# Patient Record
Sex: FEMALE | Race: WHITE | NOT HISPANIC OR LATINO | Employment: FULL TIME | URBAN - METROPOLITAN AREA
[De-identification: names, ages, dates, MRNs, and addresses within clinical notes are randomized per-mention and may not be internally consistent; named-entity substitution may affect disease eponyms.]

---

## 2019-08-20 ENCOUNTER — OFFICE VISIT (OUTPATIENT)
Dept: URGENT CARE | Facility: CLINIC | Age: 49
End: 2019-08-20

## 2019-08-20 VITALS
DIASTOLIC BLOOD PRESSURE: 70 MMHG | WEIGHT: 106 LBS | SYSTOLIC BLOOD PRESSURE: 108 MMHG | TEMPERATURE: 97.9 F | HEART RATE: 73 BPM | BODY MASS INDEX: 20.01 KG/M2 | RESPIRATION RATE: 16 BRPM | OXYGEN SATURATION: 99 % | HEIGHT: 61 IN

## 2019-08-20 DIAGNOSIS — S80.812A ABRASION OF LEFT LOWER EXTREMITY, INITIAL ENCOUNTER: ICD-10-CM

## 2019-08-20 DIAGNOSIS — Z23 NEED FOR TDAP VACCINATION: ICD-10-CM

## 2019-08-20 PROCEDURE — 90715 TDAP VACCINE 7 YRS/> IM: CPT | Performed by: PHYSICIAN ASSISTANT

## 2019-08-20 PROCEDURE — 99203 OFFICE O/P NEW LOW 30 MIN: CPT | Mod: 25 | Performed by: PHYSICIAN ASSISTANT

## 2019-08-20 PROCEDURE — 90471 IMMUNIZATION ADMIN: CPT | Performed by: PHYSICIAN ASSISTANT

## 2019-08-20 RX ORDER — METRONIDAZOLE 7.5 MG/G
GEL VAGINAL
Refills: 7 | COMMUNITY
Start: 2019-08-15

## 2019-08-20 RX ORDER — LEVOTHYROXINE SODIUM 175 UG/1
175 TABLET ORAL
COMMUNITY

## 2019-08-20 RX ORDER — PAROXETINE 10 MG/1
10 TABLET, FILM COATED ORAL DAILY
COMMUNITY

## 2019-08-20 SDOH — HEALTH STABILITY: MENTAL HEALTH: HOW OFTEN DO YOU HAVE A DRINK CONTAINING ALCOHOL?: NEVER

## 2019-08-20 ASSESSMENT — ENCOUNTER SYMPTOMS
CHILLS: 0
FEVER: 0

## 2019-08-20 NOTE — PROGRESS NOTES
"Subjective:   Loida Campos is a 48 y.o. female who presents for Laceration (cut left leg on piece of metal, requesting tetanus shot )    This is a new problem.  Patient presents to urgent care with small abrasion to left lower leg that occurred last evening.  Patient was moving a metal ladder over a stream when she sustained an abrasion to the left lower anterior leg.  She does not believe she has had a tetanus within the last 5 years and presents today for tetanus booster.    Past medical history, family history and social history are reviewed and updated in the record today.       Laceration        Review of Systems   Constitutional: Negative for chills and fever.   Skin:        Abrasion left leg   All other systems reviewed and are negative.    No Known Allergies     Objective:   /70   Pulse 73   Temp 36.6 °C (97.9 °F) (Temporal)   Resp 16   Ht 1.549 m (5' 1\")   Wt 48.1 kg (106 lb)   SpO2 99%   BMI 20.03 kg/m²   Physical Exam   Constitutional: She is oriented to person, place, and time. She appears well-developed and well-nourished.   HENT:   Head: Normocephalic and atraumatic.   Right Ear: External ear normal.   Left Ear: External ear normal.   Nose: Nose normal.   Eyes: Pupils are equal, round, and reactive to light. Conjunctivae and EOM are normal.   Neck: Normal range of motion. Neck supple.   Cardiovascular: Normal rate, regular rhythm and normal heart sounds.   Pulmonary/Chest: Effort normal and breath sounds normal.   Musculoskeletal: Normal range of motion.   Lymphadenopathy:     She has no cervical adenopathy.   Neurological: She is alert and oriented to person, place, and time.   Skin: Skin is warm and dry. No rash noted.        Left lower extremity anterior aspect with small abrasion without active bleeding or evidence of infection.   Psychiatric: She has a normal mood and affect. Judgment normal.          Assessment/Plan:   Assessment    1. Abrasion of left lower extremity, initial " encounter  - Tdap =>6yo IM    2. Need for Tdap vaccination  - Tdap =>6yo IM    Tetanus is updated today.  I reviewed with the patient I would prescribe Bactroban to use 3 times a day for 5 days however the patient reports that her mother is a nurse practitioner and she believes that her mother has this at home and will just use her mother's medication.  Observe closely for signs of infection.      Differential diagnosis, natural history, supportive care, and indications for immediate follow-up discussed.      If not improving in 3-5 days, F/U with PCP or return to  or sooner if worsens  Red flag warning symptoms and strict ER/follow-up precautions given.     The patient demonstrated a good understanding and agreed with the treatment plan.  Please note that this note was created using voice recognition speech to text software. Every effort has been made to correct obvious errors.  However, I expect there are errors of grammar and possibly context that were not discovered prior to finalizing the note  BRYANNA Coats PA-C

## 2019-09-13 ENCOUNTER — APPOINTMENT (RX ONLY)
Dept: URBAN - METROPOLITAN AREA CLINIC 35 | Facility: CLINIC | Age: 49
Setting detail: DERMATOLOGY
End: 2019-09-13

## 2019-09-13 DIAGNOSIS — Z41.9 ENCOUNTER FOR PROCEDURE FOR PURPOSES OTHER THAN REMEDYING HEALTH STATE, UNSPECIFIED: ICD-10-CM

## 2019-09-13 PROCEDURE — ? BOTOX

## 2019-09-13 PROCEDURE — ? FILLERS

## 2019-09-13 PROCEDURE — ? ADDITIONAL NOTES

## 2019-09-13 NOTE — PROCEDURE: FILLERS
Include Cannula Information In Note?: No
Nasolabial Folds Filler Volume In Cc: 0
Additional Area 4 Location: Scars
Additional Area 5 Location: Earlobes
Additional Area 1 Location: Oral Commisures
Additional Area 3 Location: Fine lines around mouth
Use Map Statement For Sites (Optional): Yes
Filler: Juvederm Voluma XC
Lot #: [id number]
Map Statment: See Attach Map for Complete Details
Additional Area 2 Location: Border of lips
Temple Hollows Filler Volume In Cc: 1
Detail Level: Detailed
Consent: Written consent obtained. Risks include but not limited to bruising, beading, irregular texture, ulceration, infection, allergic reaction, scar formation, incomplete augmentation, temporary nature, procedural pain.
Price (Use Numbers Only, No Special Characters Or $): 446
Expiration Date (Month Year): 10/22/2020
Post-Care Instructions: Patient instructed to apply ice to reduce swelling.

## 2019-09-13 NOTE — PROCEDURE: BOTOX
Masseter Units: 0
Reconstitution Date (Optional): 09/13/2019
Expiration Date (Month Year): 04/2022
Additional Area 3 Location: Frontalis
Detail Level: Detailed
Additional Area 4 Location: Bunny lines
Price (Use Numbers Only, No Special Characters Or $): 067
Additional Area 6 Location: platysma
Post-Care Instructions: Patient instructed to not lie down for 4 hours after injections and limit physical activity for 24 hours.
Additional Area 2 Location: Crows Feet
Lot #: [id number]
Additional Area 1 Location: Glabella
Additional Area 5 Location: perioral
Dilution (U/0.1 Cc): 5
Consent: Verbal and written informed consent were obtained to include the following risks: pain, swelling, bruising, eyelid or eyebrow droop, and lack of visible improvement of wrinkles in the areas treated.  The skin was cleansed with alcohol. Injections were administered with a 32g needle into the following areas:

## 2019-09-19 ENCOUNTER — APPOINTMENT (RX ONLY)
Dept: URBAN - METROPOLITAN AREA CLINIC 35 | Facility: CLINIC | Age: 49
Setting detail: DERMATOLOGY
End: 2019-09-19

## 2019-09-19 DIAGNOSIS — Z41.9 ENCOUNTER FOR PROCEDURE FOR PURPOSES OTHER THAN REMEDYING HEALTH STATE, UNSPECIFIED: ICD-10-CM

## 2019-09-19 PROCEDURE — ? FILLERS

## 2019-09-19 PROCEDURE — ? ADDITIONAL NOTES

## 2019-09-19 NOTE — PROCEDURE: FILLERS
Additional Area 1 Location: Oral Commisures
Marionette Lines Filler Volume In Cc: 0
Additional Area 2 Location: Border of lips
Post-Care Instructions: Patient instructed to apply ice to reduce swelling.
Include Cannula Information In Note?: No
Price (Use Numbers Only, No Special Characters Or $): 486
Filler Comments: 0.9 cc right temple\\n0.1 cc right mandible
Topical Anesthesia?: 23% lidocaine, 7% tetracaine
Additional Area 4 Location: Scars
Lot #: [id number]
Additional Area 5 Location: Earlobes
Additional Area 3 Location: Fine lines around mouth
Map Statment: See Attach Map for Complete Details
Detail Level: Detailed
Filler: Juvederm Voluma XC
Use Map Statement For Sites (Optional): Yes
Expiration Date (Month Year): 11/26/2020
Consent: Written consent obtained. Risks include but not limited to bruising, beading, irregular texture, ulceration, infection, allergic reaction, scar formation, incomplete augmentation, temporary nature, procedural pain.

## 2020-01-06 ENCOUNTER — APPOINTMENT (RX ONLY)
Dept: URBAN - METROPOLITAN AREA CLINIC 35 | Facility: CLINIC | Age: 50
Setting detail: DERMATOLOGY
End: 2020-01-06

## 2020-01-06 DIAGNOSIS — Z41.9 ENCOUNTER FOR PROCEDURE FOR PURPOSES OTHER THAN REMEDYING HEALTH STATE, UNSPECIFIED: ICD-10-CM

## 2020-01-06 PROCEDURE — ? BOTOX

## 2020-01-06 PROCEDURE — ? FILLERS

## 2020-01-06 PROCEDURE — ? ADDITIONAL NOTES

## 2020-01-06 NOTE — PROCEDURE: FILLERS
Additional Area 1 Location: Oral Commisures
Additional Area 5 Volume In Cc: 0
Additional Area 3 Location: Fine lines around mouth
Include Cannula Information In Note?: No
Consent: Written consent obtained. Risks include but not limited to bruising, beading, irregular texture, ulceration, infection, allergic reaction, scar formation, incomplete augmentation, temporary nature, procedural pain.
Additional Area 5 Location: Earlobes
Additional Area 2 Location: Border of lips
Filler: Juvederm Voluma XC
Additional Area 4 Location: Scars
Map Statment: See Attach Map for Complete Details
Detail Level: Detailed
Use Map Statement For Sites (Optional): Yes
Post-Care Instructions: Patient instructed to apply ice to reduce swelling.

## 2020-01-06 NOTE — PROCEDURE: BOTOX
Additional Area 5 Location: perioral
Reconstitution Date (Optional): 09/13/2019
Additional Area 4 Units: 0
Additional Area 2 Location: Crows Feet
Price (Use Numbers Only, No Special Characters Or $): 577
Expiration Date (Month Year): 04/2022
Consent: Verbal and written informed consent were obtained to include the following risks: pain, swelling, bruising, eyelid or eyebrow droop, and lack of visible improvement of wrinkles in the areas treated.  The skin was cleansed with alcohol. Injections were administered with a 32g needle into the following areas:
Additional Area 3 Location: Frontalis
Post-Care Instructions: Patient instructed to not lie down for 4 hours after injections and limit physical activity for 24 hours.
Additional Area 4 Location: Bunny lines
Detail Level: Detailed
Dilution (U/0.1 Cc): 5
Additional Area 1 Location: Glabella
Lot #: [id number]
Additional Area 6 Location: platysma

## 2020-02-06 ENCOUNTER — APPOINTMENT (RX ONLY)
Dept: URBAN - NONMETROPOLITAN AREA CLINIC 15 | Facility: CLINIC | Age: 50
Setting detail: DERMATOLOGY
End: 2020-02-06

## 2020-02-06 DIAGNOSIS — Z41.9 ENCOUNTER FOR PROCEDURE FOR PURPOSES OTHER THAN REMEDYING HEALTH STATE, UNSPECIFIED: ICD-10-CM

## 2020-02-06 PROCEDURE — ? FILLERS

## 2020-02-06 PROCEDURE — ? BOTOX

## 2020-02-06 PROCEDURE — ? ADDITIONAL NOTES

## 2020-02-06 NOTE — PROCEDURE: BOTOX
Expiration Date (Month Year): 05/22
Additional Area 5 Units: 0
Additional Area 3 Units: 10
Consent: Verbal and written informed consent were obtained to include the following risks: pain, swelling, bruising, eyelid or eyebrow droop, and lack of visible improvement of wrinkles in the areas treated.  The skin was cleansed with alcohol. Injections were administered with a 32g needle into the following areas:
Additional Area 4 Location: Bunny lines
Price (Use Numbers Only, No Special Characters Or $): 768
Additional Area 1 Units: 12
Post-Care Instructions: Patient instructed to not lie down for 4 hours after injections and limit physical activity for 24 hours.
Detail Level: Detailed
Additional Area 2 Location: Crows Feet
Additional Area 5 Location: perioral
Reconstitution Date (Optional): 02/6/20
Dilution (U/0.1 Cc): 5
Lot #: [id number]
Additional Area 6 Location: platysma
Additional Area 3 Location: Frontalis
Additional Area 2 Units: 28
Additional Area 1 Location: Glabella

## 2020-02-06 NOTE — PROCEDURE: FILLERS
Price (Use Numbers Only, No Special Characters Or $): 9517
Jawline Filler Volume In Cc: 0
Use Map Statement For Sites (Optional): Yes
Additional Area 3 Location: Fine lines around mouth
Post-Care Instructions: Patient instructed to apply ice to reduce swelling.
Include Cannula Information In Note?: No
Additional Area 5 Location: Earlobes
Expiration Date (Month Year): 8/11/20
Consent: Written consent obtained. Risks include but not limited to bruising, beading, irregular texture, ulceration, infection, allergic reaction, scar formation, incomplete augmentation, temporary nature, procedural pain.
Tear Troughs Filler Volume In Cc: 0.5
Additional Area 1 Location: Oral Commisures
Additional Area 4 Location: Scars
Additional Area 2 Location: Border of lips
Lot #: [id number]
Lot #: [id number]
Detail Level: Detailed
Filler: Juvederm Voluma XC
Temple Hollows Filler Volume In Cc: 1
Map Statment: See Attach Map for Complete Details
Topical Anesthesia?: 23% lidocaine, 7% tetracaine
Expiration Date (Month Year): 2/12/21
Filler: Juvederm Volbella XC

## 2020-10-14 ENCOUNTER — APPOINTMENT (RX ONLY)
Dept: URBAN - METROPOLITAN AREA CLINIC 35 | Facility: CLINIC | Age: 50
Setting detail: DERMATOLOGY
End: 2020-10-14

## 2020-10-14 DIAGNOSIS — Z41.9 ENCOUNTER FOR PROCEDURE FOR PURPOSES OTHER THAN REMEDYING HEALTH STATE, UNSPECIFIED: ICD-10-CM

## 2020-10-14 PROCEDURE — ? ADDITIONAL NOTES

## 2020-10-14 PROCEDURE — ? BOTOX

## 2020-10-14 PROCEDURE — ? FILLERS

## 2020-10-14 NOTE — PROCEDURE: BOTOX
Expiration Date (Month Year): 12/22
Additional Area 5 Units: 0
Additional Area 3 Units: 10
Consent: Verbal and written informed consent were obtained to include the following risks: pain, swelling, bruising, eyelid or eyebrow droop, and lack of visible improvement of wrinkles in the areas treated.  The skin was cleansed with alcohol. Injections were administered with a 32g needle into the following areas:
Additional Area 4 Location: Bunny lines
Price (Use Numbers Only, No Special Characters Or $): 317
Additional Area 1 Units: 12
Post-Care Instructions: Patient instructed to not lie down for 4 hours after injections and limit physical activity for 24 hours.
Detail Level: Detailed
Additional Area 2 Location: Crows Feet
Additional Area 5 Location: perioral
Reconstitution Date (Optional): 10/14/20
Dilution (U/0.1 Cc): 1.1
Lot #: [id number]
Additional Area 6 Location: platysma
Additional Area 3 Location: Frontalis
Additional Area 2 Units: 28
Additional Area 1 Location: Glabella

## 2020-12-30 ENCOUNTER — APPOINTMENT (RX ONLY)
Dept: URBAN - METROPOLITAN AREA CLINIC 35 | Facility: CLINIC | Age: 50
Setting detail: DERMATOLOGY
End: 2020-12-30

## 2020-12-30 DIAGNOSIS — Z41.9 ENCOUNTER FOR PROCEDURE FOR PURPOSES OTHER THAN REMEDYING HEALTH STATE, UNSPECIFIED: ICD-10-CM

## 2020-12-30 PROCEDURE — ? BOTOX

## 2020-12-30 PROCEDURE — ? ADDITIONAL NOTES

## 2020-12-30 PROCEDURE — ? FILLERS

## 2020-12-30 NOTE — PROCEDURE: FILLERS
Vermilion Lips Filler Volume In Cc: 0
Additional Area 3 Location: Fine lines around mouth
Use Map Statement For Sites (Optional): Yes
Additional Area 1 Location: Oral Commisures
Additional Area 5 Location: Earlobes
Additional Area 4 Location: Scars
Map Statment: See Attach Map for Complete Details
Include Cannula Information In Note?: No
Filler: Vollure
Detail Level: Detailed
Additional Area 2 Location: Border of lips
Lot #: [id number]
Expiration Date (Month Year): 2020-12-30
Lot #: [id number]
Post-Care Instructions: Patient instructed to apply ice to reduce swelling.
Price (Use Numbers Only, No Special Characters Or $): 1500.00
Expiration Date (Month Year): 2021-12-27
Consent: Written consent obtained. Risks include but not limited to bruising, bleeding, blindness, stroke, delayed onset of nodules, irregular texture, ulceration, infection, allergic reaction, scar formation, incomplete augmentation, temporary nature, procedural pain.
Filler: Juvederm Voluma XC

## 2020-12-30 NOTE — PROCEDURE: BOTOX
Expiration Date (Month Year): 08/2023
Additional Area 5 Units: 0
Additional Area 3 Units: 13
Consent: Verbal and written informed consent were obtained to include the following risks: pain, swelling, bruising, eyelid or eyebrow droop, and lack of visible improvement of wrinkles in the areas treated.  The skin was cleansed with alcohol. Injections were administered with a 32g needle into the following areas:
Additional Area 4 Location: Bunny lines
Price (Use Numbers Only, No Special Characters Or $): 900
Additional Area 1 Units: 12
Post-Care Instructions: Patient instructed to not lie down for 4 hours after injections and limit physical activity for 24 hours.
Detail Level: Detailed
Additional Area 2 Location: Crows Feet
Additional Area 5 Location: perioral
Reconstitution Date (Optional): 12/30/2020
Dilution (U/0.1 Cc): 0.5
Lot #: [id number]
Additional Area 6 Location: platysma
Additional Area 3 Location: Frontalis
Additional Area 2 Units: 50
Additional Area 1 Location: Glabella

## 2021-08-12 ENCOUNTER — APPOINTMENT (RX ONLY)
Dept: URBAN - METROPOLITAN AREA CLINIC 35 | Facility: CLINIC | Age: 51
Setting detail: DERMATOLOGY
End: 2021-08-12

## 2021-08-12 DIAGNOSIS — Z41.9 ENCOUNTER FOR PROCEDURE FOR PURPOSES OTHER THAN REMEDYING HEALTH STATE, UNSPECIFIED: ICD-10-CM

## 2021-08-12 PROCEDURE — ? BOTOX

## 2021-08-12 PROCEDURE — ? ADDITIONAL NOTES

## 2021-08-12 PROCEDURE — ? FILLERS

## 2021-08-12 PROCEDURE — ? COSMETIC CONSULTATION: LASER RESURFACING

## 2021-08-12 NOTE — PROCEDURE: BOTOX
Expiration Date (Month Year): 11/23
Additional Area 5 Units: 0
Additional Area 3 Units: 13
Consent: Verbal and written informed consent were obtained to include the following risks: pain, swelling, bruising, eyelid or eyebrow droop, and lack of visible improvement of wrinkles in the areas treated.  The skin was cleansed with alcohol. Injections were administered with a 32g needle into the following areas:
Additional Area 4 Location: Bunny lines
Price (Use Numbers Only, No Special Characters Or $): 900
Additional Area 1 Units: 12
Post-Care Instructions: Patient instructed to not lie down for 4 hours after injections and limit physical activity for 24 hours.
Detail Level: Detailed
Additional Area 2 Location: Crows Feet
Additional Area 5 Location: perioral
Reconstitution Date (Optional): 8/12/21
Dilution (U/0.1 Cc): 1.1
Lot #: [id number]
Additional Area 6 Location: platysma
Additional Area 3 Location: Frontalis
Additional Area 2 Units: 50
Additional Area 1 Location: Glabella

## 2021-08-12 NOTE — PROCEDURE: FILLERS
Additional Area 4 Volume In Cc: 0
Additional Area 4 Location: Scars
Additional Area 3 Location: Fine lines around mouth
Additional Area 2 Location: Border of lips
Filler: Juvederm Voluma XC
Filler: Juvederm Volbella XC
Include Cannula Information In Note?: No
Additional Area 1 Location: Oral Commisures
Lot #: [id number]
Additional Area 5 Location: Earlobes
Temple Hollows Filler Volume In Cc: 2
Map Statment: See Attach Map for Complete Details
Topical Anesthesia?: 23% lidocaine, 7% tetracaine
Price (Use Numbers Only, No Special Characters Or $): 2050
Consent: Written consent obtained. Risks include but not limited to bruising, bleeding, blindness, stroke, delayed onset of nodules, irregular texture, ulceration, infection, allergic reaction, scar formation, incomplete augmentation, temporary nature, procedural pain.
Expiration Date (Month Year): 7/10/22
Post-Care Instructions: Patient instructed to apply ice to reduce swelling.
Use Map Statement For Sites (Optional): Yes
Lot #: [id number]
Detail Level: Detailed
Tear Troughs Filler Volume In Cc: 0.5
Expiration Date (Month Year): 8/21/22

## 2022-06-10 ENCOUNTER — APPOINTMENT (RX ONLY)
Dept: URBAN - METROPOLITAN AREA CLINIC 35 | Facility: CLINIC | Age: 52
Setting detail: DERMATOLOGY
End: 2022-06-10

## 2022-06-10 DIAGNOSIS — Z41.9 ENCOUNTER FOR PROCEDURE FOR PURPOSES OTHER THAN REMEDYING HEALTH STATE, UNSPECIFIED: ICD-10-CM

## 2022-06-10 PROCEDURE — ? FILLERS

## 2022-06-10 NOTE — PROCEDURE: FILLERS
Jawline Filler Volume In Cc: 0
Lot #: [id number]
Lot #: [id number]
Price (Use Numbers Only, No Special Characters Or $): 5000
Additional Area 5 Location: Earlobes
Post-Care Instructions: Patient instructed to apply ice to reduce swelling.
Additional Area 4 Location: Scars
Additional Area 1 Location: Oral Commisures
Detail Level: Detailed
Include Cannula Information In Note?: No
Additional Area 3 Location: Fine lines around mouth
Additional Area 2 Location: Border of lips
Filler: Juvederm Voluma XC
Consent: Written consent obtained. Risks include but not limited to bruising, bleeding, blindness, stroke, delayed onset of nodules, irregular texture, ulceration, infection, allergic reaction, scar formation, incomplete augmentation, temporary nature, procedural pain.
Filler Comments: 0.1 cc per side brow bone
Map Statment: See Attach Map for Complete Details
Nasolabial Folds Filler Volume In Cc: 0.2
Topical Anesthesia?: 23% lidocaine, 7% tetracaine
Filler Comments: CK1-0.4 cc per side with needle\\n0.3 cc per side medial cheek with cannula\\n0.6 cc RIght temple\\n1cc-left temple
Expiration Date (Month Year): 5/2/23
Lot #: [id number]
Use Map Statement For Sites (Optional): Yes
Expiration Date (Month Year): 10/2/23
Filler: Juvederm Vollure XC
Expiration Date (Month Year): 2/8/23
Vermilion Lips Filler Volume In Cc: 0.4
Filler: Juvederm Ultra XC
Aspiration Statement: Aspiration was performed prior to injecting site with filler.

## 2023-07-07 ENCOUNTER — APPOINTMENT (RX ONLY)
Dept: URBAN - METROPOLITAN AREA CLINIC 35 | Facility: CLINIC | Age: 53
Setting detail: DERMATOLOGY
End: 2023-07-07

## 2023-07-07 DIAGNOSIS — Z41.9 ENCOUNTER FOR PROCEDURE FOR PURPOSES OTHER THAN REMEDYING HEALTH STATE, UNSPECIFIED: ICD-10-CM

## 2023-07-07 PROCEDURE — ? BOTOX

## 2023-07-07 PROCEDURE — ? ADDITIONAL NOTES

## 2023-07-07 PROCEDURE — ? FILLERS

## 2023-07-07 NOTE — PROCEDURE: BOTOX
Additional Area 5 Units: 0
Additional Area 3 Units: 13
Consent: Verbal and written informed consent were obtained to include the following risks: pain, swelling, bruising, eyelid or eyebrow droop, and lack of visible improvement of wrinkles in the areas treated.  The skin was cleansed with alcohol. Injections were administered with a 32g needle into the following areas:
Additional Area 4 Location: Bunny lines
Price (Use Numbers Only, No Special Characters Or $): 779
Additional Area 1 Units: 12
Post-Care Instructions: Patient instructed to not lie down for 4 hours after injections and limit physical activity for 24 hours.
Detail Level: Detailed
Additional Area 2 Location: Crows Feet
Additional Area 5 Location: perioral
Dilution (U/0.1 Cc): 1.1
Lot #: [id number]
Additional Area 6 Location: platysma
Additional Area 3 Location: Frontalis
Additional Area 2 Units: 50
Additional Area 1 Location: Glabella
Incrementing Botox Units: By 0.1 Units

## 2023-07-07 NOTE — HPI: COSMETIC (FILLERS)
Have You Had Fillers Before?: has had fillers
When Was Your Last Filler Injection?: 04/11/2023 at another office

## 2023-07-07 NOTE — PROCEDURE: FILLERS
Additional Area 4 Volume In Cc: 0
Additional Area 4 Location: Scars
Additional Area 3 Location: Fine lines around mouth
Additional Area 2 Location: Border of lips
Filler: Juvederm Voluma XC
Filler: Juvederm Ultra XC
Include Cannula Information In Note?: No
Additional Area 1 Location: Oral Commisures
Lot #: [id number]
Additional Area 5 Location: Earlobes
Map Statment: See Attach Map for Complete Details
Include Cannula Length?: 1.5 inch
Filler Comments: SplicevedQiandao Ultra XC 0.55 ml \\nbody of lips/ hydrate \\nO.C.
Topical Anesthesia?: 23% lidocaine, 7% tetracaine
Price (Use Numbers Only, No Special Characters Or $): 5272
Consent: Written consent obtained. Risks include but not limited to bruising, bleeding, blindness, stroke, delayed onset of nodules, irregular texture, ulceration, infection, allergic reaction, scar formation, incomplete augmentation, temporary nature, procedural pain.
Filler Comments: Voluma x2 (2.0 ml) \\n1 cc per side temples with cannula
Expiration Date (Month Year): 2024-02-14
Post-Care Instructions: Patient instructed to apply ice to reduce swelling.
Include Cannula Information In Note?: Yes
Lot #: [id number]
Detail Level: Detailed
Include Cannula Size?: 25G
Tear Troughs Filler Volume In Cc: 0.5
Expiration Date (Month Year): 2024-02-03
Aspiration Statement: Aspiration was performed prior to injecting site with filler.

## 2023-07-07 NOTE — HPI: COSMETIC (BOTOX)
Have You Had Botox Before?: has had botox
When Was Your Last Botox Treatment?: 04/11/2023 at another office